# Patient Record
Sex: MALE | Race: WHITE
[De-identification: names, ages, dates, MRNs, and addresses within clinical notes are randomized per-mention and may not be internally consistent; named-entity substitution may affect disease eponyms.]

---

## 2017-12-29 ENCOUNTER — HOSPITAL ENCOUNTER (EMERGENCY)
Dept: HOSPITAL 96 - M.ERS | Age: 27
Discharge: HOME | End: 2017-12-29
Payer: COMMERCIAL

## 2017-12-29 VITALS — WEIGHT: 189.99 LBS | BODY MASS INDEX: 28.79 KG/M2 | HEIGHT: 68 IN

## 2017-12-29 VITALS — SYSTOLIC BLOOD PRESSURE: 130 MMHG | DIASTOLIC BLOOD PRESSURE: 70 MMHG

## 2017-12-29 DIAGNOSIS — Z88.8: ICD-10-CM

## 2017-12-29 DIAGNOSIS — F10.99: ICD-10-CM

## 2017-12-29 DIAGNOSIS — Z88.0: ICD-10-CM

## 2017-12-29 DIAGNOSIS — R00.0: ICD-10-CM

## 2017-12-29 DIAGNOSIS — J18.8: Primary | ICD-10-CM

## 2017-12-29 LAB
ABSOLUTE EOSINOPHILS: 0.1 THOU/UL (ref 0–0.7)
ABSOLUTE MONOCYTES: 0.1 THOU/UL (ref 0–1.2)
ALBUMIN SERPL-MCNC: 3.4 G/DL (ref 3.4–5)
ALP SERPL-CCNC: 79 U/L (ref 46–116)
ALT SERPL-CCNC: 36 U/L (ref 30–65)
ANION GAP SERPL CALC-SCNC: 10 MMOL/L (ref 7–16)
AST SERPL-CCNC: 17 U/L (ref 15–37)
BILIRUB SERPL-MCNC: 0.2 MG/DL
BUN SERPL-MCNC: 12 MG/DL (ref 7–18)
CALCIUM SERPL-MCNC: 8.1 MG/DL (ref 8.5–10.1)
CHLORIDE SERPL-SCNC: 105 MMOL/L (ref 98–107)
CO2 SERPL-SCNC: 23 MMOL/L (ref 21–32)
CREAT SERPL-MCNC: 0.8 MG/DL (ref 0.6–1.3)
EOSINOPHIL NFR BLD: 1 %
GLUCOSE SERPL-MCNC: 113 MG/DL (ref 70–99)
GRANULOCYTES NFR BLD MANUAL: 93 %
HCT VFR BLD CALC: 38.6 % (ref 42–52)
HGB BLD-MCNC: 12.9 GM/DL (ref 14–18)
LYMPHOCYTES # BLD: 0.4 THOU/UL (ref 0.8–5.3)
LYMPHOCYTES NFR BLD AUTO: 3 %
MCH RBC QN AUTO: 29.6 PG (ref 26–34)
MCHC RBC AUTO-ENTMCNC: 33.5 G/DL (ref 28–37)
MCV RBC: 88.4 FL (ref 80–100)
MONOCYTES NFR BLD: 1 %
MPV: 8.4 FL. (ref 7.2–11.1)
NEUTROPHILS # BLD: 7.2 THOU/UL (ref 1.6–8.1)
NT-PRO BRAIN NAT PEPTIDE: 10 PG/ML (ref ?–300)
NUCLEATED RBCS: 0 /100WBC
PLATELET # BLD EST: ADEQUATE 10*3/UL
PLATELET COUNT*: 264 THOU/UL (ref 150–400)
POTASSIUM SERPL-SCNC: 4 MMOL/L (ref 3.5–5.1)
PROT SERPL-MCNC: 6.2 G/DL (ref 6.4–8.2)
RBC # BLD AUTO: 4.37 MIL/UL (ref 4.5–6)
RBC MORPH BLD: NORMAL
RDW-CV: 13.1 % (ref 10.5–14.5)
SODIUM SERPL-SCNC: 138 MMOL/L (ref 136–145)
TROPONIN-I LEVEL: <0.06 NG/ML (ref ?–0.06)
VARIANT LYMPHS NFR BLD MANUAL: 2 %
WBC # BLD AUTO: 7.7 THOU/UL (ref 4–11)

## 2017-12-30 NOTE — [place] [address]
Phone:  [phone]                     ELECTROCARDIOGRAM REPORT      
_______________________________________________________________________________
 
Name:       ADIA HANKINS        Room:                      [facility]#:  [id number]      Account #:      [id number]  
Admission:  17     Attend Phys:                         
Discharge:  17     Date of Birth:  [birth year]  
         Report #: [id number]
    [id number]
_______________________________________________________________________________
THIS REPORT FOR:  //name//                      
 
                         [facility] ED
                                       
Test Date:    2017               Test Time:    18:11:11
Pat Name:     ADIA HANKINS        Department:   
Patient ID:   [id number]            Room:          
Gender:       M                        Technician:   SASCHA
:          1990               Requested By: Austyn Werner
Order Number: [id number] MD:   Maximilian Guo
                                 Measurements
Intervals                              Axis          
Rate:         151                      P:            43
NH:           114                      QRS:          44
QRSD:         89                       T:            -5
QT:           268                                    
QTc:          425                                    
                           Interpretive Statements
Sinus tachycardia
Borderline T wave abnormalities
No previous ECG available for comparison
 
Electronically Signed On 2017 10:20:06 CST by Maximilian Guo
[email or web address]
 
 
 
 
 
 
 
 
 
 
 
 
 
 
 
 
 
 
 
  <ELECTRONICALLY SIGNED>
                                           By: Maximilian Guo MD, [facility]     
  17     1020
D: 17 181   _____________________________________
T: 17 181   Maximilian Guo MD, FACC       /EPI

## 2018-02-12 ENCOUNTER — HOSPITAL ENCOUNTER (EMERGENCY)
Dept: HOSPITAL 96 - M.ERS | Age: 28
Discharge: HOME | End: 2018-02-12
Payer: COMMERCIAL

## 2018-02-12 VITALS — HEIGHT: 68 IN | WEIGHT: 189.99 LBS | BODY MASS INDEX: 28.79 KG/M2

## 2018-02-12 VITALS — DIASTOLIC BLOOD PRESSURE: 53 MMHG | SYSTOLIC BLOOD PRESSURE: 116 MMHG

## 2018-02-12 DIAGNOSIS — Z88.6: ICD-10-CM

## 2018-02-12 DIAGNOSIS — R50.9: ICD-10-CM

## 2018-02-12 DIAGNOSIS — R05: ICD-10-CM

## 2018-02-12 DIAGNOSIS — J20.9: Primary | ICD-10-CM

## 2018-02-12 DIAGNOSIS — Z88.0: ICD-10-CM

## 2018-11-04 ENCOUNTER — HOSPITAL ENCOUNTER (EMERGENCY)
Dept: HOSPITAL 96 - M.ERS | Age: 28
Discharge: HOME | End: 2018-11-04
Payer: COMMERCIAL

## 2018-11-04 VITALS — HEIGHT: 67 IN | WEIGHT: 189.99 LBS | BODY MASS INDEX: 29.82 KG/M2

## 2018-11-04 VITALS — DIASTOLIC BLOOD PRESSURE: 89 MMHG | SYSTOLIC BLOOD PRESSURE: 136 MMHG

## 2018-11-04 DIAGNOSIS — Z88.6: ICD-10-CM

## 2018-11-04 DIAGNOSIS — J06.9: Primary | ICD-10-CM

## 2018-11-04 DIAGNOSIS — Z87.01: ICD-10-CM

## 2018-11-04 DIAGNOSIS — Z88.0: ICD-10-CM

## 2019-09-29 ENCOUNTER — HOSPITAL ENCOUNTER (EMERGENCY)
Dept: HOSPITAL 96 - M.ERS | Age: 29
Discharge: HOME | End: 2019-09-29
Payer: COMMERCIAL

## 2019-09-29 VITALS — HEIGHT: 67 IN | BODY MASS INDEX: 31.08 KG/M2 | WEIGHT: 198 LBS

## 2019-09-29 VITALS — DIASTOLIC BLOOD PRESSURE: 86 MMHG | SYSTOLIC BLOOD PRESSURE: 131 MMHG

## 2019-09-29 DIAGNOSIS — Z88.6: ICD-10-CM

## 2019-09-29 DIAGNOSIS — Z87.01: ICD-10-CM

## 2019-09-29 DIAGNOSIS — B08.4: ICD-10-CM

## 2019-09-29 DIAGNOSIS — Z88.0: ICD-10-CM

## 2019-09-29 DIAGNOSIS — L73.9: Primary | ICD-10-CM

## 2021-07-24 ENCOUNTER — HOSPITAL ENCOUNTER (EMERGENCY)
Dept: HOSPITAL 96 - M.ERS | Age: 31
Discharge: HOME | End: 2021-07-24
Payer: COMMERCIAL

## 2021-07-24 VITALS — BODY MASS INDEX: 34.87 KG/M2 | HEIGHT: 68 IN | WEIGHT: 230.1 LBS

## 2021-07-24 VITALS — SYSTOLIC BLOOD PRESSURE: 137 MMHG | DIASTOLIC BLOOD PRESSURE: 80 MMHG

## 2021-07-24 DIAGNOSIS — Z88.6: ICD-10-CM

## 2021-07-24 DIAGNOSIS — Z88.0: ICD-10-CM

## 2021-07-24 DIAGNOSIS — Z87.01: ICD-10-CM

## 2021-07-24 DIAGNOSIS — R51.9: Primary | ICD-10-CM
